# Patient Record
Sex: FEMALE | Race: AMERICAN INDIAN OR ALASKA NATIVE
[De-identification: names, ages, dates, MRNs, and addresses within clinical notes are randomized per-mention and may not be internally consistent; named-entity substitution may affect disease eponyms.]

---

## 2019-10-29 ENCOUNTER — HOSPITAL ENCOUNTER (OUTPATIENT)
Dept: HOSPITAL 5 - 3A | Age: 29
Setting detail: OBSERVATION
LOS: 2 days | Discharge: HOME | End: 2019-10-31
Attending: INTERNAL MEDICINE | Admitting: INTERNAL MEDICINE
Payer: MEDICAID

## 2019-10-29 ENCOUNTER — HOSPITAL ENCOUNTER (OUTPATIENT)
Dept: HOSPITAL 5 - LAB | Age: 29
Discharge: HOME | End: 2019-10-29
Attending: SURGERY
Payer: MEDICAID

## 2019-10-29 DIAGNOSIS — K80.00: Primary | ICD-10-CM

## 2019-10-29 DIAGNOSIS — R11.2: ICD-10-CM

## 2019-10-29 DIAGNOSIS — E66.9: ICD-10-CM

## 2019-10-29 DIAGNOSIS — Z87.891: ICD-10-CM

## 2019-10-29 DIAGNOSIS — K21.9: ICD-10-CM

## 2019-10-29 DIAGNOSIS — K80.20: Primary | ICD-10-CM

## 2019-10-29 LAB
ALBUMIN SERPL-MCNC: 4.2 G/DL (ref 3.9–5)
ALT SERPL-CCNC: 777 UNITS/L (ref 7–56)
BASOPHILS # (AUTO): 0.1 K/MM3 (ref 0–0.1)
BASOPHILS NFR BLD AUTO: 1.4 % (ref 0–1.8)
BUN SERPL-MCNC: 8 MG/DL (ref 7–17)
BUN/CREAT SERPL: 11 %
CALCIUM SERPL-MCNC: 9.4 MG/DL (ref 8.4–10.2)
EOSINOPHIL # BLD AUTO: 0 K/MM3 (ref 0–0.4)
EOSINOPHIL NFR BLD AUTO: 1 % (ref 0–4.3)
HCT VFR BLD CALC: 38.8 % (ref 30.3–42.9)
HEMOLYSIS INDEX: 1
HGB BLD-MCNC: 12.6 GM/DL (ref 10.1–14.3)
LYMPHOCYTES # BLD AUTO: 1.5 K/MM3 (ref 1.2–5.4)
LYMPHOCYTES NFR BLD AUTO: 36.7 % (ref 13.4–35)
MCHC RBC AUTO-ENTMCNC: 33 % (ref 30–34)
MCV RBC AUTO: 79 FL (ref 79–97)
MONOCYTES # (AUTO): 0.3 K/MM3 (ref 0–0.8)
MONOCYTES % (AUTO): 7.4 % (ref 0–7.3)
PLATELET # BLD: 290 K/MM3 (ref 140–440)
RBC # BLD AUTO: 4.95 M/MM3 (ref 3.65–5.03)

## 2019-10-29 PROCEDURE — G0378 HOSPITAL OBSERVATION PER HR: HCPCS

## 2019-10-29 PROCEDURE — G0379 DIRECT REFER HOSPITAL OBSERV: HCPCS

## 2019-10-29 PROCEDURE — 96374 THER/PROPH/DIAG INJ IV PUSH: CPT

## 2019-10-29 PROCEDURE — 80053 COMPREHEN METABOLIC PANEL: CPT

## 2019-10-29 PROCEDURE — 96375 TX/PRO/DX INJ NEW DRUG ADDON: CPT

## 2019-10-29 PROCEDURE — 36415 COLL VENOUS BLD VENIPUNCTURE: CPT

## 2019-10-29 PROCEDURE — 81025 URINE PREGNANCY TEST: CPT

## 2019-10-29 PROCEDURE — 88304 TISSUE EXAM BY PATHOLOGIST: CPT

## 2019-10-29 PROCEDURE — 47562 LAPAROSCOPIC CHOLECYSTECTOMY: CPT

## 2019-10-29 PROCEDURE — 85025 COMPLETE CBC W/AUTO DIFF WBC: CPT

## 2019-10-29 PROCEDURE — 96361 HYDRATE IV INFUSION ADD-ON: CPT

## 2019-10-29 PROCEDURE — 96376 TX/PRO/DX INJ SAME DRUG ADON: CPT

## 2019-10-29 PROCEDURE — 99284 EMERGENCY DEPT VISIT MOD MDM: CPT

## 2019-10-29 RX ADMIN — MORPHINE SULFATE PRN MG: 2 INJECTION, SOLUTION INTRAMUSCULAR; INTRAVENOUS at 16:37

## 2019-10-29 RX ADMIN — MORPHINE SULFATE PRN MG: 2 INJECTION, SOLUTION INTRAMUSCULAR; INTRAVENOUS at 20:43

## 2019-10-29 RX ADMIN — SODIUM CHLORIDE SCH MLS/HR: 0.9 INJECTION, SOLUTION INTRAVENOUS at 16:38

## 2019-10-29 NOTE — HISTORY AND PHYSICAL REPORT
History of Present Illness


Date of admission: 


10/29/19 15:12





Chief complaint: 





My stomach hurts


History of present illness: 


30 YO Female with GERD, Obesity admitted directly at the request of Dr. Tejada 

for treatment of symptomatic Cholelithiasis. Pt seen and evaluated upon arrival 

to her room. Pt denies fever, chillls, CP, Palpitations, trauma, productive 

cough, skin rahs, unilateral leg swelling, calf pain, individual/family history 

of DVT/PE/Bleeding/Blood Clotting Disorders, Prolonged travel/immobility, 

shortness of breath, or recent ill contacts. Pt acknowledges nausea. Surgical 

team consulted. Pt pending surgical intervention as per surgical team. 





Past History


Past Medical History: other (see hpi)


Past Surgical History: No surgical history (reviewed)


Social history: .  denies: smoking, alcohol abuse, prescription drug 

abuse


Family history: hypertension





Medications and Allergies


                                    Allergies











Allergy/AdvReac Type Severity Reaction Status Date / Time


 


hydrocodone Allergy  Hives Verified 10/29/19 18:02











                                Home Medications











 Medication  Instructions  Recorded  Confirmed  Last Taken  Type


 


Amoxicillin [Trimox CAP] 500 mg PO Q12HR #14 capsule 12/09/15 10/29/19 Unknown 

Rx


 


Ibuprofen [Motrin 800 MG tab] 800 mg PO Q8HR PRN #15 tablet 12/09/15 10/29/19 

Unknown Rx


 


Ciprofloxacin HCl 500 mg PO BID 10/29/19 10/29/19 10/28/19 History


 


traMADol [Ultram 50 MG tab] 50 mg PO PRN PRN 10/29/19 10/29/19 10/29/19 11:15 

History











Active Meds: 


Active Medications





Sodium Chloride (Nacl 0.9% 1000 Ml)  1,000 mls @ 75 mls/hr IV AS DIRECT MARLEN


   Last Admin: 10/29/19 16:38 Dose:  75 mls/hr


   Documented by: 


Morphine Sulfate (Morphine)  2 mg IV Q4H PRN


   PRN Reason: Pain, Moderate (4-6)


   Last Admin: 10/29/19 16:37 Dose:  2 mg


   Documented by: 


Ondansetron HCl (Zofran)  4 mg IV Q6H PRN


   PRN Reason: Nausea And Vomiting


   Last Admin: 10/29/19 17:18 Dose:  4 mg


   Documented by: 











Review of Systems


Constitutional: no weight loss, no weight gain, no fever, no chills, no sweats


Ears, nose, mouth and throat: no ear pain, no ear discharge, no tinnitis, no 

decreased hearing, no nose pain, no nasal congestion, no nasal discharge


Breasts: no change in shape, no swelling, no mass, no skin changes, no 

breastfeeding


Cardiovascular: no chest pain, no orthopnea, no rapid/irregular heart beat, no 

lightheadedness


Respiratory: no cough, no cough with sputum, no excessive sputum


Gastrointestinal: abdominal pain, nausea, vomiting, no diarrhea, no 

constipation, no change in bowel habits, no BRBPR, no melena, no hematochezia


Genitourinary Female: no pelvic pain, no flank pain, no menorrhagia, no dysuria,

no urinary frequency, no urgency, no stress incontinence


Rectal: no pain, no incontinence, no bleeding


Musculoskeletal: no neck stiffness, no neck pain, no shooting arm pain, no arm 

numbness/tingling, no low back pain, no shooting leg pain


Integumentary: no rash, no pruritis, no redness, no sores, no wounds


Neurological: no paralysis, no weakness, no parathesias, no numbness, no 

tingling, no seizures, no syncope


Psychiatric: no anxiety, no memory loss, no sleep disturbances, no hypersomnia, 

no change in appetite, no change in libido


Endocrine: no cold intolerance, no heat intolerance, no polyphagia, no excessive

thirst, no polydipsia, no polyuria


Hematologic/Lymphatic: no easy bruising, no easy bleeding, no lymphadenopathy


Allergic/Immunologic: no urticaria, no persistent infections, no angioedema





Exam





- Constitutional


Vitals: 


                                        











Temp Pulse Resp BP Pulse Ox


 


 98.1 F   80   18   127/70   98 


 


 10/29/19 15:33  10/29/19 15:33  10/29/19 17:07  10/29/19 15:33  10/29/19 15:33











General appearance: Present: mild distress





- EENT


Eyes: Present: PERRL


ENT: hearing intact, clear oral mucosa





- Neck


Neck: Present: supple, normal ROM





- Respiratory


Respiratory effort: normal


Respiratory: bilateral: CTA





- Cardiovascular


Heart Sounds: Present: S1 & S2.  Absent: rub, click





- Extremities


Extremities: pulses symmetrical, No edema


Peripheral Pulses: within normal limits





- Abdominal


General gastrointestinal: Present: soft, non-tender, non-distended, normal bowel

sounds


Female genitourinary: Present: normal





- Integumentary


Integumentary: Present: clear, warm, dry





- Musculoskeletal


Musculoskeletal: gait normal, strength equal bilaterally





- Psychiatric


Psychiatric: appropriate mood/affect, intact judgment & insight





- Neurologic


Neurologic: CNII-XII intact, moves all extremities





Assessment and Plan





- Patient Problems


(1) Cholelithiasis


Current Visit: Yes   Status: Acute   


Qualifiers: 


   Cholelithiasis location: gallbladder 


Plan to address problem: 


Surgery team consulted, surgical intervention in AM, NPO after midnight, bowel 

rest, IVF resuscitation therapy, pain control, anti emetic therapy








(2) Obesity (BMI 35.0-39.9 without comorbidity)


Current Visit: Yes   Status: Acute   


Plan to address problem: 


Balanced diet, increased physical activity at discharge, 








(3) GERD (gastroesophageal reflux disease)


Current Visit: Yes   Status: Acute   


Qualifiers: 


   Esophagitis presence: without esophagitis   Qualified Code(s): K21.9 - 

Gastro-esophageal reflux disease without esophagitis   


Plan to address problem: 


PPI therapy, supportive care








(4) DVT prophylaxis


Current Visit: Yes   Status: Acute   


Plan to address problem: 


SCD to BLE while in bed, Pt ambulatory

## 2019-10-30 LAB
ALBUMIN SERPL-MCNC: 3.8 G/DL (ref 3.9–5)
ALT SERPL-CCNC: 499 UNITS/L (ref 7–56)
BUN SERPL-MCNC: 8 MG/DL (ref 7–17)
BUN/CREAT SERPL: 11 %
CALCIUM SERPL-MCNC: 9 MG/DL (ref 8.4–10.2)
HEMOLYSIS INDEX: 7

## 2019-10-30 RX ADMIN — SODIUM CHLORIDE SCH MLS/HR: 0.9 INJECTION, SOLUTION INTRAVENOUS at 20:28

## 2019-10-30 RX ADMIN — SODIUM CHLORIDE SCH MLS/HR: 0.9 INJECTION, SOLUTION INTRAVENOUS at 06:23

## 2019-10-30 NOTE — DISCHARGE SUMMARY
Providers





- Providers


Date of Admission: 


10/29/19 15:12





Attending physician: 


MARZENA HULL MD





                                        





10/29/19 12:41


Consult to Physician [CONS] Routine 


   Comment: 


   Consulting Provider: FERNANDA LÓPEZ


   Physician Instructions: 


   Reason For Exam: CHOLELITHIASIS











Primary care physician: 


PRIMARY CARE MD








Hospitalization


Hospital course: 





29-year-old woman with GERD obesity who presented to the hospital with abdominal

 pain





Biliary colic


sp  cholecystectomy and tolerated well





Obesity


Preventative health counseling performed for 17 minutes


Outpatient weight loss clinic recommended





DVT prophylaxis early ambulation








Disposition: DC-01 TO HOME OR SELFCARE


Time spent for discharge: 35 minutes





Core Measure Documentation





- Palliative Care


Palliative Care/ Comfort Measures: Not Applicable





- Core Measures


Any of the following diagnoses?: none





Exam





- Constitutional


Vitals: 


                                        











Temp Pulse Resp BP Pulse Ox


 


 98.0 F   86   16   117/75   98 


 


 10/30/19 07:13  10/30/19 07:13  10/30/19 07:13  10/30/19 07:13  10/30/19 07:13











General appearance: Present: no acute distress, well-nourished





- EENT


Eyes: Present: PERRL


ENT: hearing intact, clear oral mucosa





- Neck


Neck: Present: supple, normal ROM





- Respiratory


Respiratory effort: normal


Respiratory: bilateral: CTA





- Cardiovascular


Heart Sounds: Present: S1 & S2.  Absent: rub, click





- Extremities


Extremities: pulses symmetrical, No edema


Peripheral Pulses: within normal limits





- Abdominal


General gastrointestinal: Present: soft, non-tender, non-distended, normal bowel

 sounds


Female genitourinary: Present: normal





- Integumentary


Integumentary: Present: clear, warm, dry





- Musculoskeletal


Musculoskeletal: gait normal, strength equal bilaterally





- Psychiatric


Psychiatric: appropriate mood/affect, intact judgment & insight





- Neurologic


Neurologic: CNII-XII intact, moves all extremities





Plan


Follow up with: 


PRIMARY CARE,MD [Primary Care Provider] - 7 Days


Prescriptions: 


oxyCODONE /ACETAMINOPHEN [Percocet 5/325] 1 tab PO Q6HR PRN #20 tablet


 PRN Reason: Pain

## 2019-10-30 NOTE — CONSULTATION
History of Present Illness


Consult date: 10/29/19


Reason for consult: gallstones


Chief complaint: 





abdominal pain





- History of present illness


History of present illness: 





29 year old female who presented for consultation regarding getting her 

gallbladder removed after a work up in the ER at an OSH that showed gallstones 

that may be the cause of her RUQ pain.  She had lab work done that was 

suggestive of cholelithiasis. The pain has been getting progressively worse. It 

was discussed with the patient that removing her gallbladder may resolve her 

symptoms and she is agreeable to surgery.





Past History


Past Medical History: GERD, other (see hpi)


Past Surgical History: No surgical history (reviewed)


Social history: .  denies: smoking, alcohol abuse, prescription drug 

abuse


Family history: hypertension





Medications and Allergies


                                    Allergies











Allergy/AdvReac Type Severity Reaction Status Date / Time


 


hydrocodone Allergy  Hives Verified 10/29/19 18:02











                                Home Medications











 Medication  Instructions  Recorded  Confirmed  Last Taken  Type


 


Amoxicillin [Trimox CAP] 500 mg PO Q12HR #14 capsule 12/09/15 10/29/19 Unknown 

Rx


 


Ibuprofen [Motrin 800 MG tab] 800 mg PO Q8HR PRN #15 tablet 12/09/15 10/29/19 

Unknown Rx


 


Ciprofloxacin HCl 500 mg PO BID 10/29/19 10/29/19 10/28/19 History


 


traMADol [Ultram 50 MG tab] 50 mg PO PRN PRN 10/29/19 10/29/19 10/29/19 11:15 

History











Active Meds: 


Active Medications





Sodium Chloride (Nacl 0.9% 1000 Ml)  1,000 mls @ 75 mls/hr IV AS DIRECT MARLEN


   Last Admin: 10/30/19 06:23 Dose:  75 mls/hr


   Documented by: 


Morphine Sulfate (Morphine)  2 mg IV Q4H PRN


   PRN Reason: Pain, Moderate (4-6)


   Last Admin: 10/29/19 20:43 Dose:  2 mg


   Documented by: 


Ondansetron HCl (Zofran)  4 mg IV Q6H PRN


   PRN Reason: Nausea And Vomiting


   Last Admin: 10/29/19 17:18 Dose:  4 mg


   Documented by: 











Review of Systems





- Constitutional


malaise, poor appetite, no weight loss





- Cardiovascular


no chest pain





- Respiratory


no shortness of breath





- Gastrointestinal


abdominal pain, nausea





Exam


                                   Vital Signs











Temp Pulse Resp BP Pulse Ox


 


 98.1 F   80   18   127/70   98 


 


 10/29/19 15:33  10/29/19 15:33  10/29/19 15:33  10/29/19 15:33  10/29/19 15:33














- General physical appearance


Positive: well developed, well nourished, no distress, moderate pain





- Respiratory


Positive: normal expansion, normal respiratory effort





- Extremities


Extremities: no ischemia





- Abdomen


Abdomen: Present: soft, other (tender to palpation right upper quadrant).  

Absent: guarding, rigid


Hernia: none





Results





- Labs





                                 10/30/19 05:12


                              Abnormal lab results











  10/30/19 Range/Units





  05:12 


 


AST  187 H  (5-40)  units/L


 


ALT  499 H  (7-56)  units/L


 


Alkaline Phosphatase  304 H  ()  units/L


 


Albumin  3.8 L  (3.9-5)  g/dL








                                 Diabetes panel











  10/30/19 Range/Units





  05:12 


 


Sodium  140  (137-145)  mmol/L


 


Potassium  4.3  (3.6-5.0)  mmol/L


 


Chloride  105.7  ()  mmol/L


 


Carbon Dioxide  24  (22-30)  mmol/L


 


BUN  8  (7-17)  mg/dL


 


Creatinine  0.7  (0.7-1.2)  mg/dL


 


Glucose  94  ()  mg/dL


 


Calcium  9.0  (8.4-10.2)  mg/dL


 


AST  187 H  (5-40)  units/L


 


ALT  499 H  (7-56)  units/L


 


Alkaline Phosphatase  304 H  ()  units/L


 


Total Protein  7.1  (6.3-8.2)  g/dL


 


Albumin  3.8 L  (3.9-5)  g/dL








                                  Calcium panel











  10/30/19 Range/Units





  05:12 


 


Calcium  9.0  (8.4-10.2)  mg/dL


 


Albumin  3.8 L  (3.9-5)  g/dL








                                 Pituitary panel











  10/30/19 Range/Units





  05:12 


 


Sodium  140  (137-145)  mmol/L


 


Potassium  4.3  (3.6-5.0)  mmol/L


 


Chloride  105.7  ()  mmol/L


 


Carbon Dioxide  24  (22-30)  mmol/L


 


BUN  8  (7-17)  mg/dL


 


Creatinine  0.7  (0.7-1.2)  mg/dL


 


Glucose  94  ()  mg/dL


 


Calcium  9.0  (8.4-10.2)  mg/dL








                                  Adrenal panel











  10/30/19 Range/Units





  05:12 


 


Sodium  140  (137-145)  mmol/L


 


Potassium  4.3  (3.6-5.0)  mmol/L


 


Chloride  105.7  ()  mmol/L


 


Carbon Dioxide  24  (22-30)  mmol/L


 


BUN  8  (7-17)  mg/dL


 


Creatinine  0.7  (0.7-1.2)  mg/dL


 


Glucose  94  ()  mg/dL


 


Calcium  9.0  (8.4-10.2)  mg/dL


 


Total Bilirubin  0.50  (0.1-1.2)  mg/dL


 


AST  187 H  (5-40)  units/L


 


ALT  499 H  (7-56)  units/L


 


Alkaline Phosphatase  304 H  ()  units/L


 


Total Protein  7.1  (6.3-8.2)  g/dL


 


Albumin  3.8 L  (3.9-5)  g/dL














Assessment and Plan





29 year old female with symptomatic cholelithiasis and down trending liver 

enzymes. Bilirubin as normalized. afebrile and stable.


will take today for lap juli.

## 2019-10-30 NOTE — ANESTHESIA CONSULTATION
Anesthesia Consult and Med Hx


Date of service: 10/30/19





- Airway


Anesthetic Teeth Evaluation: Good


ROM Head & Neck: Adequate


Mental/Hyoid Distance: Adequate


Mallampati Class: Class II


Intubation Access Assessment: Probably Good





- Pulmonary Exam


CTA: Yes





- Cardiac Exam


Cardiac Exam: RRR





- Pre-Operative Health Status


ASA Pre-Surgery Classification: ASA2


Proposed Anesthetic Plan: General





- Pulmonary


Hx Smoking: No


Hx Respiratory Symptoms: No





- Cardiovascular System


Hx Hypertension: No





- Central Nervous System


CVA: No





- Gastrointestinal


Hx Gastroesophageal Reflux Disease: Yes





- Endocrine


Hx Renal Disease: No


Hx Liver Disease: Yes (transaminitis)


Hx Insulin Dependent Diabetes: No


Hx Non-Insulin Dependent Diabetes: No


Hx Thyroid Disease: No





- Other Systems


Hx Obesity: Yes (BMI 35)





- Additional Comments


Anesthesia Medical History Comments: No prior GA. No FHx anesthetic 

complications.

## 2019-10-30 NOTE — PROGRESS NOTE
Assessment and Plan


Assessment and plan: 





29-year-old woman with GERD obesity who presented to the hospital with abdominal

pain





Biliary colic


For cholecystectomy today





Obesity


Preventative health counseling performed for 17 minutes


Outpatient weight loss clinic recommended





DVT prophylaxis early ambulation





History


Interval history: 





Review of systems


Constitutional: No fevers, no malaise, no joint pains





CVS: No chest pain, no orthopnea,  no pedal edema





GI: Right upper quadrant tenderness





Respiratory: No shortness of breath, no wheezing, no coughing





Hospitalist Physical





- Physical exam


Narrative exam: 





General.: Appears well, no distress, nontoxic





HEENT: Moist mucous membranes, extraocular muscles intact, no lymphadenopathy





Neck: supple





Cardiac: S1-S2 heard





Lungs: Right upper quadrant is tender





Abdomen: soft , nontender,  nondistended,  bowel sounds positive





Extremities: no edema clubbing or cyanosis





Skin: no rash or lesions





Neurologic: no gross focal deficits


Psych: calm, and cooperative 

















- Constitutional


Vitals: 


                                        











Temp Pulse Resp BP Pulse Ox


 


 98.0 F   86   16   117/75   98 


 


 10/30/19 07:13  10/30/19 07:13  10/30/19 07:13  10/30/19 07:13  10/30/19 07:13











General appearance: Present: no acute distress, well-nourished





Results





- Labs


CBC & Chem 7: 


                                 10/30/19 05:12


Labs: 


                             Laboratory Last Values











Sodium  140 mmol/L (137-145)   10/30/19  05:12    


 


Potassium  4.3 mmol/L (3.6-5.0)   10/30/19  05:12    


 


Chloride  105.7 mmol/L ()   10/30/19  05:12    


 


Carbon Dioxide  24 mmol/L (22-30)   10/30/19  05:12    


 


Anion Gap  15 mmol/L  10/30/19  05:12    


 


BUN  8 mg/dL (7-17)   10/30/19  05:12    


 


Creatinine  0.7 mg/dL (0.7-1.2)   10/30/19  05:12    


 


Estimated GFR  > 60 ml/min  10/30/19  05:12    


 


BUN/Creatinine Ratio  11 %  10/30/19  05:12    


 


Glucose  94 mg/dL ()   10/30/19  05:12    


 


Calcium  9.0 mg/dL (8.4-10.2)   10/30/19  05:12    


 


Total Bilirubin  0.50 mg/dL (0.1-1.2)   10/30/19  05:12    


 


AST  187 units/L (5-40)  H  10/30/19  05:12    


 


ALT  499 units/L (7-56)  H  10/30/19  05:12    


 


Alkaline Phosphatase  304 units/L ()  H  10/30/19  05:12    


 


Total Protein  7.1 g/dL (6.3-8.2)   10/30/19  05:12    


 


Albumin  3.8 g/dL (3.9-5)  L  10/30/19  05:12    


 


Albumin/Globulin Ratio  1.2 %  10/30/19  05:12    














Active Medications





- Current Medications


Current Medications: 














Generic Name Dose Route Start Last Admin





  Trade Name Freq  PRN Reason Stop Dose Admin


 


Sodium Chloride  1,000 mls @ 75 mls/hr  10/29/19 14:00  10/30/19 06:23





  Nacl 0.9% 1000 Ml  IV   75 mls/hr





  AS DIRECT MARLEN   Administration


 


Morphine Sulfate  2 mg  10/29/19 12:43  10/29/19 20:43





  Morphine  IV   2 mg





  Q4H PRN   Administration





  Pain, Moderate (4-6)  


 


Ondansetron HCl  4 mg  10/29/19 16:49  10/29/19 17:18





  Zofran  IV   4 mg





  Q6H PRN   Administration





  Nausea And Vomiting

## 2019-10-31 VITALS — SYSTOLIC BLOOD PRESSURE: 118 MMHG | DIASTOLIC BLOOD PRESSURE: 75 MMHG

## 2019-10-31 RX ADMIN — HYDROMORPHONE HYDROCHLORIDE PRN MG: 1 INJECTION, SOLUTION INTRAMUSCULAR; INTRAVENOUS; SUBCUTANEOUS at 15:36

## 2019-10-31 RX ADMIN — HYDROMORPHONE HYDROCHLORIDE PRN MG: 1 INJECTION, SOLUTION INTRAMUSCULAR; INTRAVENOUS; SUBCUTANEOUS at 15:21

## 2019-10-31 NOTE — EVENT NOTE
Date: 10/31/19





Pt seen after an uneventful lap juli. She has no complaints and is stable. She 

is ok to go home from my perspective and can follow up in the office in the next

two weeks. [phone]

## 2019-10-31 NOTE — OPERATIVE REPORT
Operative Report


Operative Report: 





DATE: 10/31/2019





SURGEON: FERNANDA LÓPEZ MD





ASSISTANT SURGEON: CAROLANN DOHERTY DO





PROCEDURE: LAPAROSCOPIC CHOLECYSTECTOMY





ANESTHESIA: GETA





SPECIMEN: GALLBLADDER AND CONTENTS





EBL: MINIMAL





COMPLICATIONS: NONE IMMEDIATE





FINDINGS: GALLBLADDER WITH MULTIPLE STONES





INDICATION: 29 YEAR OLD FEMALE WITH A SEVERAL YEAR HX OF ABDOMINAL PAIN. RECENT 

WORK UP AT AN OSH SHOWED GALLSTONES. SHE WAS EVALUATED AND FOUND TO BE A GOOD 

CANDIDATE FOR LAPAROSCOPIC CHOLECYSTECTOMY. SHE SIGNED INFORMED CONSENT. 





DETAILS OF PROCEDURE: PT WAS BROUGHT INTO OR SUITE AND LAID IN SUPINE POSITION. 

BILATERAL LOWER EXTREMITY SCD WERE PLACED. GENERAL ANESTHESIA WAS INDUCED WITH 

SUCCESFUL ENDOTRACHEAL INTUBATION. HER ABDOMEN WAS PREPPED AND DRAPED IN STERILE

FASHION. USING A VERESS NEEDLE THE ABDOMEN WAS INSUFLATED TO A PRESSURE OF 

15MMHG THROUGH THE UMBILICUS. AFTER WHICH USING OPTIVIEW TECHNIQUE A 5MM TROCAR 

WAS INSERTED JUST SUPERIOR AND TO THE LEFT OF THE UMBILICUS. THERE WAS NOTED TO 

BE NO GROSS INJURY TO ANY INTRA-ABDOMINAL STRUCTURES. THREE WORKING TROCARS WERE

PLACED UNDER DIRECT VISUALIZATION. A 12MM IN THE EPIGASTRIC AREA, 5MM IN THE 

RIGHT LATERAL MID ABDOMEN, AND A 5MM IN THE RIGHT SUB COSTAL REGION. THE PATIENT

WAS PLACED IN REVERSE TRENDELENBERG POSITION AND TILTED SLIGHTLY TO HER LEFT. 

THE FUNDUS OF THE GALLBLADDER WAS LIFTED CEPHALED OVER THE DOME OF THE LIVER BY 

THE ASSISTANT SURGEON, WITH LATERAL RETRACTION OF THE INFUNDIBULUM.  THE CYSTIC 

DUCT AND ARTERY WERE IDENTIFIED AND SKELETONIZED UNTIL A CRITICAL VIEW WAS 

APPRECIATED WITH VISUALIZATION OF THE LIVER BEHIND. CLIPS WERE PLACED ON THE 

PROXIMAL AND DISTAL CYSTIC ARETERY, AND TWO PROXIMAL CLIPS , ONE DISTAL ON THE 

CYSTIC DUCT. THESE STRUCTURES WERE THEN TRANSECTED WITH SCISSORS. THERE WAS A 

SMALL ACCESSORY ARTERY ADHERED TO THE CYSTIC DUCT THAT WAS CLIPPED. SINCE THE 

CYSTIC DUCT WAS A BIT WIDER THAN USUAL, AN ENDOLOOP WAS ALSO PLACED AROUND THE 

CYSTIC DUCT STUMP AND SECURED. THE REMAINDER OF THE GALLBLADDER WAS DISSECTED 

OFF OF THE GALLBLADDER FOSSA. THE AREA WAS INSPECTED AND FOUND TO BE 

HEMOSTATICALLY SOUND WITH NO EVIDENCE OF BILE LEAKAGE. THE GALLBLADDER WAS 

PLACED IN AN ENDO-CATCH BAG AND RETRIEVED OUT OF THE 12MM PORT. THIS PORT SITE 

WAS CLOSE WITH AN 0 VICRYL USING A LUCY BRODY FASCIAL CLOSURE DEVICE. TROC

ARS WERE REMOVED UNDER DIRECT VISION TO CONFIRM HEMOSTASIS AND THE ABDOMEN WAS 

DESUFLATED. ALL SKIN INCISIONS WERE CLOSED WITH 4-O MONOCRYL FOLLOWED BY 

DERMABOND.  PT WAS AWOKEN, EXTUBAED, AND TAKEN TO RECOVERY IN STABLE CONDITION. 

ALL COUNTS WERE CORRECT.